# Patient Record
Sex: FEMALE | Race: ASIAN | URBAN - METROPOLITAN AREA
[De-identification: names, ages, dates, MRNs, and addresses within clinical notes are randomized per-mention and may not be internally consistent; named-entity substitution may affect disease eponyms.]

---

## 2024-11-13 ENCOUNTER — NURSE ONLY (OUTPATIENT)
Dept: INTERNAL MEDICINE CLINIC | Facility: HOSPITAL | Age: 29
End: 2024-11-13
Attending: PREVENTIVE MEDICINE

## 2024-11-13 DIAGNOSIS — Z00.00 WELLNESS EXAMINATION: Primary | ICD-10-CM

## 2024-11-13 PROCEDURE — 86480 TB TEST CELL IMMUN MEASURE: CPT

## 2024-11-15 LAB
M TB IFN-G CD4+ T-CELLS BLD-ACNC: 0.37 IU/ML
M TB TUBERC IFN-G BLD QL: NEGATIVE
M TB TUBERC IGNF/MITOGEN IGNF CONTROL: >10 IU/ML
QFT TB1 AG MINUS NIL: 0.06 IU/ML
QFT TB2 AG MINUS NIL: -0.07 IU/ML

## 2025-03-27 ENCOUNTER — OFFICE VISIT (OUTPATIENT)
Dept: FAMILY MEDICINE CLINIC | Facility: CLINIC | Age: 30
End: 2025-03-27
Payer: COMMERCIAL

## 2025-03-27 VITALS
RESPIRATION RATE: 18 BRPM | OXYGEN SATURATION: 100 % | HEART RATE: 88 BPM | BODY MASS INDEX: 33.9 KG/M2 | TEMPERATURE: 98 F | SYSTOLIC BLOOD PRESSURE: 120 MMHG | WEIGHT: 201 LBS | DIASTOLIC BLOOD PRESSURE: 82 MMHG | HEIGHT: 64.4 IN

## 2025-03-27 DIAGNOSIS — Z82.49 FAMILY HISTORY OF HEART DISEASE: ICD-10-CM

## 2025-03-27 DIAGNOSIS — Z78.9 VEGETARIAN DIET: ICD-10-CM

## 2025-03-27 DIAGNOSIS — E66.9 OBESITY WITH BODY MASS INDEX GREATER THAN 30: ICD-10-CM

## 2025-03-27 DIAGNOSIS — Z31.41 FERTILITY TESTING: ICD-10-CM

## 2025-03-27 DIAGNOSIS — Z11.3 ROUTINE SCREENING FOR STI (SEXUALLY TRANSMITTED INFECTION): ICD-10-CM

## 2025-03-27 DIAGNOSIS — Z00.00 WELL ADULT EXAM: Primary | ICD-10-CM

## 2025-03-27 DIAGNOSIS — Z12.4 SCREENING FOR CERVICAL CANCER: ICD-10-CM

## 2025-03-27 PROCEDURE — 99385 PREV VISIT NEW AGE 18-39: CPT | Performed by: STUDENT IN AN ORGANIZED HEALTH CARE EDUCATION/TRAINING PROGRAM

## 2025-03-27 PROCEDURE — 99213 OFFICE O/P EST LOW 20 MIN: CPT | Performed by: STUDENT IN AN ORGANIZED HEALTH CARE EDUCATION/TRAINING PROGRAM

## 2025-03-27 NOTE — PROGRESS NOTES
HPI:    Patient ID: Ab Moss is a 29 year old female.    HPI  Pt presenting for routine physical exam. Denies any acute issues or recent illnesses. No significant chronic medical problems. Past medical/surgical history, family history, and social history were reviewed.     Vegetarian diet  Concerned about nutrient deficiencies    Will be TTC late 2026  Reports regular menses, initial heavy flow  LMP 2/24/2025    Reports increased fatigue  Endorses work stress: L&D RN  Adequate sleep  Snoring while on back  Working on water intake 1-2L daily    FHx prediabetes, heart disease / CVA (father)  FHx Celiac disease (mother)    New vaginal lesion, persistent  Nontender  Stopped shaving    H/o obesity BMI 34.07 (201lbs) today  Has been calorie counting inconsistently, limiting to 1800cal    1/2c cooked rice BID  Veggies via thompson  Paneer, tofu  Eggs  Chickpeas, lentils, beans    Lifting twice weekly  Overnight oats with plant based protein powder  Occasional dining out    Mood stable, denies SH/SI/HI    Review of Systems   A comprehensive 10 point review of systems was completed.  Pertinent positives and negatives noted in the the HPI.       No current outpatient medications on file.     Allergies:Allergies[1]   Vitals:    03/27/25 1019   BP: 120/82   Pulse: 88   Resp: 18   Temp: 98.2 °F (36.8 °C)   SpO2: 100%   Weight: 201 lb (91.2 kg)   Height: 5' 4.4\" (1.636 m)       Body mass index is 34.07 kg/m².   PHYSICAL EXAM:   Physical Exam  Vitals reviewed.   Constitutional:       General: She is not in acute distress.     Appearance: Normal appearance. She is well-developed.   HENT:      Head: Normocephalic and atraumatic.      Right Ear: Tympanic membrane, ear canal and external ear normal.      Left Ear: Tympanic membrane, ear canal and external ear normal.   Eyes:      Conjunctiva/sclera: Conjunctivae normal.   Neck:      Thyroid: No thyroid mass or thyroid tenderness.   Cardiovascular:      Rate and Rhythm: Normal rate  and regular rhythm.      Pulses: Normal pulses.      Heart sounds: Normal heart sounds, S1 normal and S2 normal. No murmur heard.  Pulmonary:      Effort: Pulmonary effort is normal. No respiratory distress.      Breath sounds: Normal breath sounds. No wheezing, rhonchi or rales.   Chest:   Breasts:     Right: No mass.      Left: No mass.   Abdominal:      General: Bowel sounds are normal.      Palpations: Abdomen is soft.      Tenderness: There is no abdominal tenderness. There is no guarding or rebound.   Genitourinary:     Exam position: Lithotomy position.      Vagina: Lesions present.      Cervix: Normal.      Uterus: Not tender.       Adnexa:         Right: No tenderness.          Left: No tenderness.            Comments: Angiokeratoma on Left vulva  Musculoskeletal:      Cervical back: Normal range of motion and neck supple. No muscular tenderness.      Right lower leg: No edema.      Left lower leg: No edema.   Lymphadenopathy:      Cervical: No cervical adenopathy.      Upper Body:      Right upper body: No supraclavicular or axillary adenopathy.      Left upper body: No supraclavicular or axillary adenopathy.   Skin:     General: Skin is warm and dry.      Coloration: Skin is not jaundiced.   Neurological:      General: No focal deficit present.      Mental Status: She is alert and oriented to person, place, and time. Mental status is at baseline.   Psychiatric:         Attention and Perception: Attention normal.         Mood and Affect: Mood normal.         Behavior: Behavior normal. Behavior is cooperative.         Cognition and Memory: Cognition normal.              ASSESSMENT/PLAN:   1. Well adult exam  Discussed preventative screenings  - will check Pap/HPV testing today  - will check labs as below  - encouraged to continue diet/exercise for overall wellness  - follow-up with eye doctor annually  - follow-up with dentist every 6 months  - return yearly for physicals  - annual flu shot  - tetanus  booster every 10yrs  - TSH W Reflex To Free T4; Future  - Comp Metabolic Panel (14); Future  - Hemoglobin A1C; Future  - Lipid Panel; Future  - CBC With Differential With Platelet; Future    2. Vegetarian diet  Will check labs per request  - Vitamin D; Future  - Ferritin; Future  - Iron And Tibc; Future  - Vitamin B12; Future  - VITAMIN B6 [926] [Q]  - Magnesium [E]; Future  - Zinc, Plasma Or Serum [E]; Future  - CELIAC DISEASE SCREEN Reflex [E]; Future    3. Routine screening for STI (sexually transmitted infection)  - will check STI panel  - discussed safe sex practices  - Chlamydia/Gc Amplification; Future  - Hepatitis Panel, Acute (4); Future  - HIV Ag/Ab Combo; Future  - T Pallidum Screening [place]; Future  - Chlamydia/Gc Amplification    4. Fertility testing  Discussed role of hormone levels  Encouraged healthy diet and lifestyle changes for wellness  - Cortisol [E]; Future  - Testosterone,Total and Weakly Bound w/ SHBG; Future  - Dehydroepiandrosterone Sulfate; Future  - Prolactin [E]; Future  - Anti-Mullerian Hormone; Future  - FSH [E]; Future    5. Family history of heart disease  Will check labs  - CARDIO IQ OMEGA 3 AND 6 FATTY ACID,PLASMA [11500][Q]  - C-RP/High Sensitivity [E]; Future  - Lipoprotein (A) [E]; Future  - Apolipoprotein B; Future    6. Obesity with body mass index greater than 30  - discussed healthy diet and lifestyle changes for overall wellness, which includes decreased carb and sugar intake, increased fiber intake, and increased water intake as tolerated, as well as regular exercise  - counseled on increased protein intake, goal 20-30g/meal  - goal moderate-intensity activity 30min daily / 150min per week  - will monitor labs as above  - dietitian for support  - Cortisol [E]; Future  - DIETITIAN EDUCATION NON-DIABETES, DIET (INTERNAL)    7. Screening for cervical cancer  - ThinPrep PAP Smear; Future  - Hpv Dna  High Risk , Thin Prep Collect; Future  - Hpv Dna  High Risk , Thin Prep  Collect  - ThinPrep PAP Smear    Pt verbalized understanding and agrees with plan.    Orders Placed This Encounter   Procedures    TSH W Reflex To Free T4    Comp Metabolic Panel (14)    Hemoglobin A1C    Lipid Panel    Vitamin D    CBC With Differential With Platelet    Ferritin    Iron And Tibc    Vitamin B12    Cortisol [E]    VITAMIN B6 [926] [Q]    Magnesium [E]    CARDIO IQ OMEGA 3 AND 6 FATTY ACID,PLASMA [14824][Q]    Zinc, Plasma Or Serum [E]    C-RP/High Sensitivity [E]    Testosterone,Total and Weakly Bound w/ SHBG    Dehydroepiandrosterone Sulfate    Prolactin [E]    Anti-Mullerian Hormone    FSH [E]    Hepatitis Panel, Acute (4)    HIV Ag/Ab Combo    T Pallidum Screening [place]    CELIAC DISEASE SCREEN Reflex [E]    Lipoprotein (A) [E]    Apolipoprotein B    Hpv Dna  High Risk , Thin Prep Collect    ThinPrep PAP Smear    Chlamydia/Gc Amplification       Meds This Visit:  Requested Prescriptions      No prescriptions requested or ordered in this encounter       Imaging & Referrals:  DIETITIAN EDUCATION NON-DIABETES, DIET (INTERNAL)         ID#[id number]         [1]   Allergies  Allergen Reactions     Covid-19 Mrna Vacc (Moderna) ITCHING and RASH

## 2025-03-28 LAB
C TRACH DNA SPEC QL NAA+PROBE: NEGATIVE
HPV E6+E7 MRNA CVX QL NAA+PROBE: NEGATIVE
N GONORRHOEA DNA SPEC QL NAA+PROBE: NEGATIVE

## 2025-03-29 ENCOUNTER — PATIENT MESSAGE (OUTPATIENT)
Dept: FAMILY MEDICINE CLINIC | Facility: CLINIC | Age: 30
End: 2025-03-29

## 2025-03-29 ENCOUNTER — LAB ENCOUNTER (OUTPATIENT)
Dept: LAB | Age: 30
End: 2025-03-29
Attending: STUDENT IN AN ORGANIZED HEALTH CARE EDUCATION/TRAINING PROGRAM
Payer: COMMERCIAL

## 2025-03-29 DIAGNOSIS — Z82.49 FAMILY HISTORY OF HEART DISEASE: Primary | ICD-10-CM

## 2025-03-29 DIAGNOSIS — E66.9 OBESITY WITH BODY MASS INDEX GREATER THAN 30: ICD-10-CM

## 2025-03-29 DIAGNOSIS — Z00.00 WELL ADULT EXAM: ICD-10-CM

## 2025-03-29 DIAGNOSIS — Z11.3 ROUTINE SCREENING FOR STI (SEXUALLY TRANSMITTED INFECTION): ICD-10-CM

## 2025-03-29 DIAGNOSIS — Z31.41 FERTILITY TESTING: ICD-10-CM

## 2025-03-29 DIAGNOSIS — Z78.9 VEGETARIAN DIET: ICD-10-CM

## 2025-03-29 LAB
ALBUMIN SERPL-MCNC: 4.3 G/DL (ref 3.2–4.8)
ALBUMIN/GLOB SERPL: 1.4 {RATIO} (ref 1–2)
ALP LIVER SERPL-CCNC: 79 U/L
ALT SERPL-CCNC: 11 U/L
ANION GAP SERPL CALC-SCNC: 8 MMOL/L (ref 0–18)
AST SERPL-CCNC: 13 U/L (ref ?–34)
BASOPHILS # BLD AUTO: 0.02 X10(3) UL (ref 0–0.2)
BASOPHILS NFR BLD AUTO: 0.3 %
BILIRUB SERPL-MCNC: 0.3 MG/DL (ref 0.3–1.2)
BUN BLD-MCNC: 11 MG/DL (ref 9–23)
BUN/CREAT SERPL: 20.8 (ref 10–20)
CALCIUM BLD-MCNC: 9.1 MG/DL (ref 8.7–10.4)
CHLORIDE SERPL-SCNC: 107 MMOL/L (ref 98–112)
CHOLEST SERPL-MCNC: 149 MG/DL (ref ?–200)
CO2 SERPL-SCNC: 23 MMOL/L (ref 21–32)
CORTIS SERPL-MCNC: 11.1 UG/DL
CREAT BLD-MCNC: 0.53 MG/DL
CRP SERPL HS-MCNC: 6.27 MG/L (ref ?–3)
DEPRECATED HBV CORE AB SER IA-ACNC: 37 NG/ML
DEPRECATED RDW RBC AUTO: 38.2 FL (ref 35.1–46.3)
DHEA-S SERPL-MCNC: 127.4 UG/DL
EGFRCR SERPLBLD CKD-EPI 2021: 127 ML/MIN/1.73M2 (ref 60–?)
EOSINOPHIL # BLD AUTO: 0.12 X10(3) UL (ref 0–0.7)
EOSINOPHIL NFR BLD AUTO: 1.9 %
ERYTHROCYTE [DISTWIDTH] IN BLOOD BY AUTOMATED COUNT: 12.4 % (ref 11–15)
EST. AVERAGE GLUCOSE BLD GHB EST-MCNC: 108 MG/DL (ref 68–126)
FASTING PATIENT LIPID ANSWER: YES
FASTING STATUS PATIENT QL REPORTED: YES
FSH SERPL-ACNC: 4.9 MIU/ML
GLOBULIN PLAS-MCNC: 3.1 G/DL (ref 2–3.5)
GLUCOSE BLD-MCNC: 90 MG/DL (ref 70–99)
HAV IGM SER QL: NONREACTIVE
HBA1C MFR BLD: 5.4 % (ref ?–5.7)
HBV CORE IGM SER QL: NONREACTIVE
HBV SURFACE AG SERPL QL IA: NONREACTIVE
HCT VFR BLD AUTO: 36.8 %
HCV AB SERPL QL IA: NONREACTIVE
HDLC SERPL-MCNC: 26 MG/DL (ref 40–59)
HGB BLD-MCNC: 12.1 G/DL
IGA SERPL-MCNC: 229 MG/DL (ref 70–312)
IMM GRANULOCYTES # BLD AUTO: 0.01 X10(3) UL (ref 0–1)
IMM GRANULOCYTES NFR BLD: 0.2 %
IRON SATN MFR SERPL: 9 %
IRON SERPL-MCNC: 30 UG/DL
LDLC SERPL CALC-MCNC: 104 MG/DL (ref ?–100)
LYMPHOCYTES # BLD AUTO: 2.72 X10(3) UL (ref 1–4)
LYMPHOCYTES NFR BLD AUTO: 42.5 %
MAGNESIUM SERPL-MCNC: 2.1 MG/DL (ref 1.6–2.6)
MCH RBC QN AUTO: 27.5 PG (ref 26–34)
MCHC RBC AUTO-ENTMCNC: 32.9 G/DL (ref 31–37)
MCV RBC AUTO: 83.6 FL
MONOCYTES # BLD AUTO: 0.4 X10(3) UL (ref 0.1–1)
MONOCYTES NFR BLD AUTO: 6.3 %
NEUTROPHILS # BLD AUTO: 3.13 X10 (3) UL (ref 1.5–7.7)
NEUTROPHILS # BLD AUTO: 3.13 X10(3) UL (ref 1.5–7.7)
NEUTROPHILS NFR BLD AUTO: 48.8 %
NONHDLC SERPL-MCNC: 123 MG/DL (ref ?–130)
OSMOLALITY SERPL CALC.SUM OF ELEC: 285 MOSM/KG (ref 275–295)
PLATELET # BLD AUTO: 302 10(3)UL (ref 150–450)
POTASSIUM SERPL-SCNC: 3.7 MMOL/L (ref 3.5–5.1)
PROLACTIN SERPL-MCNC: 18.1 NG/ML
PROT SERPL-MCNC: 7.4 G/DL (ref 5.7–8.2)
RBC # BLD AUTO: 4.4 X10(6)UL
SODIUM SERPL-SCNC: 138 MMOL/L (ref 136–145)
T PALLIDUM AB SER QL IA: NONREACTIVE
TOTAL IRON BINDING CAPACITY: 316 UG/DL (ref 250–425)
TRANSFERRIN SERPL-MCNC: 240 MG/DL (ref 250–380)
TRIGL SERPL-MCNC: 100 MG/DL (ref 30–149)
TSI SER-ACNC: 2.09 UIU/ML (ref 0.55–4.78)
VIT B12 SERPL-MCNC: 339 PG/ML (ref 211–911)
VIT D+METAB SERPL-MCNC: 14.4 NG/ML (ref 30–100)
VLDLC SERPL CALC-MCNC: 17 MG/DL (ref 0–30)
WBC # BLD AUTO: 6.4 X10(3) UL (ref 4–11)

## 2025-03-29 PROCEDURE — 86364 TISS TRNSGLTMNASE EA IG CLAS: CPT

## 2025-03-29 PROCEDURE — 83036 HEMOGLOBIN GLYCOSYLATED A1C: CPT

## 2025-03-29 PROCEDURE — 80074 ACUTE HEPATITIS PANEL: CPT

## 2025-03-29 PROCEDURE — 83001 ASSAY OF GONADOTROPIN (FSH): CPT

## 2025-03-29 PROCEDURE — 84410 TESTOSTERONE BIOAVAILABLE: CPT

## 2025-03-29 PROCEDURE — 80061 LIPID PANEL: CPT

## 2025-03-29 PROCEDURE — 84207 ASSAY OF VITAMIN B-6: CPT | Performed by: STUDENT IN AN ORGANIZED HEALTH CARE EDUCATION/TRAINING PROGRAM

## 2025-03-29 PROCEDURE — 85025 COMPLETE CBC W/AUTO DIFF WBC: CPT

## 2025-03-29 PROCEDURE — 82627 DEHYDROEPIANDROSTERONE: CPT

## 2025-03-29 PROCEDURE — 83520 IMMUNOASSAY QUANT NOS NONAB: CPT

## 2025-03-29 PROCEDURE — 82172 ASSAY OF APOLIPOPROTEIN: CPT

## 2025-03-29 PROCEDURE — 84443 ASSAY THYROID STIM HORMONE: CPT

## 2025-03-29 PROCEDURE — 82725 ASSAY OF BLOOD FATTY ACIDS: CPT

## 2025-03-29 PROCEDURE — 84630 ASSAY OF ZINC: CPT

## 2025-03-29 PROCEDURE — 82728 ASSAY OF FERRITIN: CPT

## 2025-03-29 PROCEDURE — 83695 ASSAY OF LIPOPROTEIN(A): CPT

## 2025-03-29 PROCEDURE — 83735 ASSAY OF MAGNESIUM: CPT

## 2025-03-29 PROCEDURE — 84466 ASSAY OF TRANSFERRIN: CPT

## 2025-03-29 PROCEDURE — 82784 ASSAY IGA/IGD/IGG/IGM EACH: CPT

## 2025-03-29 PROCEDURE — 83540 ASSAY OF IRON: CPT

## 2025-03-29 PROCEDURE — 80053 COMPREHEN METABOLIC PANEL: CPT

## 2025-03-29 PROCEDURE — 87389 HIV-1 AG W/HIV-1&-2 AB AG IA: CPT

## 2025-03-29 PROCEDURE — 86780 TREPONEMA PALLIDUM: CPT

## 2025-03-29 PROCEDURE — 86141 C-REACTIVE PROTEIN HS: CPT

## 2025-03-29 PROCEDURE — 82533 TOTAL CORTISOL: CPT

## 2025-03-29 PROCEDURE — 82306 VITAMIN D 25 HYDROXY: CPT

## 2025-03-29 PROCEDURE — 84146 ASSAY OF PROLACTIN: CPT

## 2025-03-29 PROCEDURE — 82607 VITAMIN B-12: CPT

## 2025-03-29 PROCEDURE — 36415 COLL VENOUS BLD VENIPUNCTURE: CPT

## 2025-03-31 LAB — TTG IGA SER-ACNC: 0.5 U/ML (ref ?–7)

## 2025-04-01 DIAGNOSIS — E78.41 ELEVATED LIPOPROTEIN A LEVEL: Primary | ICD-10-CM

## 2025-04-01 LAB — LIPOPROTEIN (A): 79.4 NMOL/L

## 2025-04-02 LAB — APOLIPOPROTEIN B: 90 MG/DL

## 2025-04-03 LAB
Lab: 0.3 MEQ/L
SEX HORM BIND GLOB: 39.9 NMOL/L
TESTOST % FREE+WEAK BND: 12.7 %
TESTOST FREE+WEAK BND: 2.3 NG/DL
TESTOSTERONE TOT /MS: 18.5 NG/DL
ZINC: 77 UG/DL

## 2025-04-04 LAB — VITAMIN B6: 27.2 UG/L

## 2025-04-10 LAB — MULLERIAN AMH: 1.91 NG/ML

## 2025-07-29 ENCOUNTER — HOSPITAL ENCOUNTER (OUTPATIENT)
Dept: CT IMAGING | Facility: HOSPITAL | Age: 30
Discharge: HOME | End: 2025-07-29
Attending: INTERNAL MEDICINE

## 2025-07-29 ENCOUNTER — HOSPITAL ENCOUNTER (OUTPATIENT)
Dept: ULTRASOUND IMAGING | Facility: HOSPITAL | Age: 30
Discharge: HOME | End: 2025-07-29
Attending: INTERNAL MEDICINE

## 2025-07-29 ENCOUNTER — LAB ENCOUNTER (OUTPATIENT)
Dept: LAB | Facility: HOSPITAL | Age: 30
End: 2025-07-29
Attending: INTERNAL MEDICINE

## 2025-07-29 DIAGNOSIS — E78.01 FAMILIAL HYPERCHOLESTEROLEMIA: ICD-10-CM

## 2025-07-29 DIAGNOSIS — Z82.49 FAM HX-ISCHEM HEART DISEASE: ICD-10-CM

## 2025-07-29 DIAGNOSIS — Z86.2 HX OF IRON DEFICIENCY ANEMIA: ICD-10-CM

## 2025-07-29 DIAGNOSIS — R79.0 LOW FERRITIN: ICD-10-CM

## 2025-07-29 DIAGNOSIS — Z13.6 SCREENING FOR ISCHEMIC HEART DISEASE: ICD-10-CM

## 2025-07-29 DIAGNOSIS — E78.41 ELEVATED LIPOPROTEIN A LEVEL: Primary | ICD-10-CM

## 2025-07-29 DIAGNOSIS — E55.9 VITAMIN D DEFICIENCY: ICD-10-CM

## 2025-07-29 LAB
ALBUMIN SERPL-MCNC: 4.6 G/DL (ref 3.2–4.8)
ALBUMIN/GLOB SERPL: 1.5 (ref 1–2)
ALP LIVER SERPL-CCNC: 81 U/L (ref 37–98)
ALT SERPL-CCNC: 18 U/L (ref 10–49)
ANION GAP SERPL CALC-SCNC: 7 MMOL/L (ref 0–18)
AST SERPL-CCNC: 15 U/L (ref ?–34)
BASOPHILS # BLD AUTO: 0.03 X10(3) UL (ref 0–0.2)
BASOPHILS NFR BLD AUTO: 0.4 %
BILIRUB SERPL-MCNC: 0.4 MG/DL (ref 0.3–1.2)
BUN BLD-MCNC: 8 MG/DL (ref 9–23)
BUN/CREAT SERPL: 12.7 (ref 10–20)
CALCIUM BLD-MCNC: 9.7 MG/DL (ref 8.7–10.4)
CHLORIDE SERPL-SCNC: 106 MMOL/L (ref 98–112)
CHOLEST SERPL-MCNC: 172 MG/DL (ref ?–200)
CO2 SERPL-SCNC: 23 MMOL/L (ref 21–32)
CREAT BLD-MCNC: 0.63 MG/DL (ref 0.55–1.02)
CRP SERPL HS-MCNC: 3.35 MG/L (ref ?–3)
DEPRECATED HBV CORE AB SER IA-ACNC: 47 NG/ML (ref 50–306)
DEPRECATED RDW RBC AUTO: 40.4 FL (ref 35.1–46.3)
EGFRCR SERPLBLD CKD-EPI 2021: 122 ML/MIN/1.73M2 (ref 60–?)
EOSINOPHIL # BLD AUTO: 0.13 X10(3) UL (ref 0–0.7)
EOSINOPHIL NFR BLD AUTO: 1.6 %
ERYTHROCYTE [DISTWIDTH] IN BLOOD BY AUTOMATED COUNT: 12.8 % (ref 11–15)
ERYTHROCYTE [SEDIMENTATION RATE] IN BLOOD: 71 MM/HR (ref 0–20)
FASTING PATIENT LIPID ANSWER: YES
FASTING STATUS PATIENT QL REPORTED: YES
GLOBULIN PLAS-MCNC: 3.1 G/DL (ref 2–3.5)
GLUCOSE BLD-MCNC: 84 MG/DL (ref 70–99)
HCT VFR BLD AUTO: 39.7 % (ref 35–48)
HDLC SERPL-MCNC: 34 MG/DL (ref 40–59)
HGB BLD-MCNC: 13 G/DL (ref 12–16)
IMM GRANULOCYTES # BLD AUTO: 0.02 X10(3) UL (ref 0–1)
IMM GRANULOCYTES NFR BLD: 0.2 %
IRON SATN MFR SERPL: 44 % (ref 15–50)
IRON SERPL-MCNC: 149 UG/DL (ref 50–170)
LDLC SERPL CALC-MCNC: 115 MG/DL (ref ?–100)
LYMPHOCYTES # BLD AUTO: 3.21 X10(3) UL (ref 1–4)
LYMPHOCYTES NFR BLD AUTO: 39 %
MCH RBC QN AUTO: 28.4 PG (ref 26–34)
MCHC RBC AUTO-ENTMCNC: 32.7 G/DL (ref 31–37)
MCV RBC AUTO: 86.7 FL (ref 80–100)
MONOCYTES # BLD AUTO: 0.45 X10(3) UL (ref 0.1–1)
MONOCYTES NFR BLD AUTO: 5.5 %
NEUTROPHILS # BLD AUTO: 4.4 X10 (3) UL (ref 1.5–7.7)
NEUTROPHILS # BLD AUTO: 4.4 X10(3) UL (ref 1.5–7.7)
NEUTROPHILS NFR BLD AUTO: 53.3 %
NONHDLC SERPL-MCNC: 138 MG/DL (ref ?–130)
NT-PROBNP SERPL-MCNC: 42 PG/ML (ref ?–125)
OSMOLALITY SERPL CALC.SUM OF ELEC: 280 MOSM/KG (ref 275–295)
PLATELET # BLD AUTO: 323 10(3)UL (ref 150–450)
POTASSIUM SERPL-SCNC: 4.6 MMOL/L (ref 3.5–5.1)
PROT SERPL-MCNC: 7.7 G/DL (ref 5.7–8.2)
RBC # BLD AUTO: 4.58 X10(6)UL (ref 3.8–5.3)
SODIUM SERPL-SCNC: 136 MMOL/L (ref 136–145)
TOTAL IRON BINDING CAPACITY: 337 UG/DL (ref 250–425)
TRANSFERRIN SERPL-MCNC: 249 MG/DL (ref 250–380)
TRIGL SERPL-MCNC: 127 MG/DL (ref 30–149)
TSI SER-ACNC: 1.4 UIU/ML (ref 0.55–4.78)
VIT D+METAB SERPL-MCNC: 43.2 NG/ML (ref 30–100)
VLDLC SERPL CALC-MCNC: 22 MG/DL (ref 0–30)
WBC # BLD AUTO: 8.2 X10(3) UL (ref 4–11)

## 2025-07-29 PROCEDURE — 83880 ASSAY OF NATRIURETIC PEPTIDE: CPT

## 2025-07-29 PROCEDURE — 85025 COMPLETE CBC W/AUTO DIFF WBC: CPT

## 2025-07-29 PROCEDURE — 93880 EXTRACRANIAL BILAT STUDY: CPT | Performed by: INTERNAL MEDICINE

## 2025-07-29 PROCEDURE — 82728 ASSAY OF FERRITIN: CPT

## 2025-07-29 PROCEDURE — 84466 ASSAY OF TRANSFERRIN: CPT

## 2025-07-29 PROCEDURE — 86141 C-REACTIVE PROTEIN HS: CPT

## 2025-07-29 PROCEDURE — 80061 LIPID PANEL: CPT

## 2025-07-29 PROCEDURE — 82306 VITAMIN D 25 HYDROXY: CPT

## 2025-07-29 PROCEDURE — 80053 COMPREHEN METABOLIC PANEL: CPT

## 2025-07-29 PROCEDURE — 36415 COLL VENOUS BLD VENIPUNCTURE: CPT

## 2025-07-29 PROCEDURE — 84443 ASSAY THYROID STIM HORMONE: CPT

## 2025-07-29 PROCEDURE — 85652 RBC SED RATE AUTOMATED: CPT

## 2025-07-29 PROCEDURE — 83540 ASSAY OF IRON: CPT

## 2025-08-25 DIAGNOSIS — E55.9 VITAMIN D DEFICIENCY: ICD-10-CM

## 2025-08-29 RX ORDER — ERGOCALCIFEROL 1.25 MG/1
50000 CAPSULE, LIQUID FILLED ORAL WEEKLY
Qty: 12 CAPSULE | Refills: 1 | Status: SHIPPED | OUTPATIENT
Start: 2025-08-29